# Patient Record
Sex: MALE | Race: WHITE | Employment: PART TIME | URBAN - METROPOLITAN AREA
[De-identification: names, ages, dates, MRNs, and addresses within clinical notes are randomized per-mention and may not be internally consistent; named-entity substitution may affect disease eponyms.]

---
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
## 2022-02-03 ENCOUNTER — TELEPHONE (OUTPATIENT)
Dept: FAMILY MEDICINE CLINIC | Age: 22
End: 2022-02-03

## 2022-03-22 ENCOUNTER — OFFICE VISIT (OUTPATIENT)
Dept: FAMILY MEDICINE CLINIC | Age: 22
End: 2022-03-22
Payer: COMMERCIAL

## 2022-03-22 VITALS
BODY MASS INDEX: 30.87 KG/M2 | HEART RATE: 88 BPM | SYSTOLIC BLOOD PRESSURE: 122 MMHG | WEIGHT: 220.5 LBS | HEIGHT: 71 IN | OXYGEN SATURATION: 98 % | DIASTOLIC BLOOD PRESSURE: 80 MMHG

## 2022-03-22 DIAGNOSIS — Z00.00 ENCOUNTER FOR ANNUAL PHYSICAL EXAM: Primary | ICD-10-CM

## 2022-03-22 DIAGNOSIS — F33.41 RECURRENT MAJOR DEPRESSIVE DISORDER, IN PARTIAL REMISSION (HCC): ICD-10-CM

## 2022-03-22 PROCEDURE — 99385 PREV VISIT NEW AGE 18-39: CPT | Performed by: STUDENT IN AN ORGANIZED HEALTH CARE EDUCATION/TRAINING PROGRAM

## 2022-03-22 PROCEDURE — G8484 FLU IMMUNIZE NO ADMIN: HCPCS | Performed by: STUDENT IN AN ORGANIZED HEALTH CARE EDUCATION/TRAINING PROGRAM

## 2022-03-22 RX ORDER — QUETIAPINE FUMARATE 50 MG/1
TABLET, FILM COATED ORAL
COMMUNITY
Start: 2022-02-17

## 2022-03-22 RX ORDER — VENLAFAXINE HYDROCHLORIDE 75 MG/1
CAPSULE, EXTENDED RELEASE ORAL
COMMUNITY
Start: 2022-02-17

## 2022-03-22 ASSESSMENT — PATIENT HEALTH QUESTIONNAIRE - PHQ9
5. POOR APPETITE OR OVEREATING: 1
SUM OF ALL RESPONSES TO PHQ9 QUESTIONS 1 & 2: 6
1. LITTLE INTEREST OR PLEASURE IN DOING THINGS: 3
SUM OF ALL RESPONSES TO PHQ QUESTIONS 1-9: 19
2. FEELING DOWN, DEPRESSED OR HOPELESS: 3
SUM OF ALL RESPONSES TO PHQ QUESTIONS 1-9: 19
4. FEELING TIRED OR HAVING LITTLE ENERGY: 3
10. IF YOU CHECKED OFF ANY PROBLEMS, HOW DIFFICULT HAVE THESE PROBLEMS MADE IT FOR YOU TO DO YOUR WORK, TAKE CARE OF THINGS AT HOME, OR GET ALONG WITH OTHER PEOPLE: 1
SUM OF ALL RESPONSES TO PHQ QUESTIONS 1-9: 19
9. THOUGHTS THAT YOU WOULD BE BETTER OFF DEAD, OR OF HURTING YOURSELF: 0
3. TROUBLE FALLING OR STAYING ASLEEP: 3
7. TROUBLE CONCENTRATING ON THINGS, SUCH AS READING THE NEWSPAPER OR WATCHING TELEVISION: 3
6. FEELING BAD ABOUT YOURSELF - OR THAT YOU ARE A FAILURE OR HAVE LET YOURSELF OR YOUR FAMILY DOWN: 1
SUM OF ALL RESPONSES TO PHQ QUESTIONS 1-9: 19
8. MOVING OR SPEAKING SO SLOWLY THAT OTHER PEOPLE COULD HAVE NOTICED. OR THE OPPOSITE, BEING SO FIGETY OR RESTLESS THAT YOU HAVE BEEN MOVING AROUND A LOT MORE THAN USUAL: 2

## 2022-03-22 NOTE — PROGRESS NOTES
3/22/2022    Lavonne Crain (:  2000) is a 23 y.o. male, here for evaluation of the following medical concerns:  Chief Complaint   Patient presents with   [facility]       HPI    Depression  Following with psychiatry. Every couple of weeks  Has started seroquel and Effexor  Feels this is helping  Not currently in counseling    Has lost 25lbs in last 8 months  Tries to watch weight with family that is obese    Review of Systems   Allergies  Worsening vision - has optometry appointment    All other systems reviewed and negative    Prior to Visit Medications    Medication Sig Taking?  Authorizing Provider   venlafaxine (EFFEXOR XR) 75 MG extended release capsule  Yes Historical Provider, MD   QUEtiapine (SEROQUEL) 50 MG tablet  Yes Historical Provider, MD        No Known Allergies    Past Medical History:   Diagnosis Date    ADHD (attention deficit hyperactivity disorder)     Anxiety     Depression        Past Surgical History:   Procedure Laterality Date    CYST REMOVAL      OTHER SURGICAL HISTORY      perianal fistula repair    OTHER SURGICAL HISTORY      brachial cleft pit repair       Social History     Socioeconomic History    Marital status: Single     Spouse name: Not on file    Number of children: Not on file    Years of education: Not on file    Highest education level: Not on file   Occupational History    Not on file   Tobacco Use    Smoking status: Never Smoker    Smokeless tobacco: Never Used   Vaping Use    Vaping Use: Never used   Substance and Sexual Activity    Alcohol use: No    Drug use: No    Sexual activity: Not on file   Other Topics Concern    Not on file   Social History Narrative    Not on file     Social Determinants of Health     Financial Resource Strain:     Difficulty of Paying Living Expenses: Not on file   Food Insecurity:     Worried About Running Out of Food in the Last Year: Not on file    Franck of Food in the Last Year: Not on file Transportation Needs:     Lack of Transportation (Medical): Not on file    Lack of Transportation (Non-Medical): Not on file   Physical Activity:     Days of Exercise per Week: Not on file    Minutes of Exercise per Session: Not on file   Stress:     Feeling of Stress : Not on file   Social Connections:     Frequency of Communication with Friends and Family: Not on file    Frequency of Social Gatherings with Friends and Family: Not on file    Attends Oriental orthodox Services: Not on file    Active Member of [address] [place] [address] or Organizations: Not on file    Attends Club or Organization Meetings: Not on file    Marital Status: Not on file   Intimate Partner Violence:     Fear of Current or Ex-Partner: Not on file    Emotionally Abused: Not on file    Physically Abused: Not on file    Sexually Abused: Not on file   Housing Stability:     Unable to Pay for Housing in the Last Year: Not on file    Number of [place] in the Last Year: Not on file    Unstable Housing in the Last Year: Not on file        No family history on file. Vitals:    03/22/22 0805   BP: 122/80   Site: Right Upper Arm   Position: Sitting   Cuff Size: Large Adult   Pulse: 88   SpO2: 98%   Weight: 220 lb 8 oz (100 kg)   Height: 5' 10.5\" (1.791 m)     Estimated body mass index is 31.19 kg/m² as calculated from the following:    Height as of this encounter: 5' 10.5\" (1.791 m). Weight as of this encounter: 220 lb 8 oz (100 kg). Physical Exam  Vitals reviewed. Constitutional:       Appearance: Normal appearance. HENT:      Head: Normocephalic and atraumatic. Cardiovascular:      Rate and Rhythm: Normal rate and regular rhythm. Pulmonary:      Effort: Pulmonary effort is normal.      Breath sounds: Normal breath sounds. Neurological:      General: No focal deficit present. Mental Status: He is alert and oriented to person, place, and time. Psychiatric:         Behavior: Behavior normal.         Thought Content:  Thought content normal.         ASSESSMENT/PLAN:  1. Encounter for annual physical exam  Reports that he has not seen a PCP in several years. No acute concerns today. 2. Recurrent major depressive disorder, in partial remission ([facility] [address]  Reports that he is following with psychiatry and feels that medications are improving his current mood. No active SI/HI. No follow-ups on file. An  electronic signature was used to authenticate this note.     --Reji Blanco, DO on 3/22/2022 at 8:33 AM

## 2022-08-11 ENCOUNTER — APPOINTMENT (OUTPATIENT)
Dept: GENERAL RADIOLOGY | Age: 22
End: 2022-08-11
Payer: COMMERCIAL

## 2022-08-11 ENCOUNTER — HOSPITAL ENCOUNTER (EMERGENCY)
Age: 22
Discharge: HOME | End: 2022-08-12
Attending: EMERGENCY MEDICINE
Payer: COMMERCIAL

## 2022-08-11 VITALS
RESPIRATION RATE: 20 BRPM | BODY MASS INDEX: 32.54 KG/M2 | WEIGHT: 230 LBS | DIASTOLIC BLOOD PRESSURE: 88 MMHG | TEMPERATURE: 101.1 F | HEART RATE: 99 BPM | SYSTOLIC BLOOD PRESSURE: 126 MMHG | OXYGEN SATURATION: 97 %

## 2022-08-11 DIAGNOSIS — R50.9 FEVER, UNSPECIFIED FEVER CAUSE: Primary | ICD-10-CM

## 2022-08-11 DIAGNOSIS — B34.9 VIRAL SYNDROME: ICD-10-CM

## 2022-08-11 LAB
INFLUENZA A: NOT DETECTED
INFLUENZA B: NOT DETECTED
S PYO AG THROAT QL: NEGATIVE
SARS-COV-2 RNA, RT PCR: NOT DETECTED

## 2022-08-11 PROCEDURE — 87081 CULTURE SCREEN ONLY: CPT

## 2022-08-11 PROCEDURE — 71045 X-RAY EXAM CHEST 1 VIEW: CPT

## 2022-08-11 PROCEDURE — 87880 STREP A ASSAY W/OPTIC: CPT

## 2022-08-11 PROCEDURE — 6360000002 HC RX W HCPCS: Performed by: EMERGENCY MEDICINE

## 2022-08-11 PROCEDURE — 87636 SARSCOV2 & INF A&B AMP PRB: CPT

## 2022-08-11 PROCEDURE — 87077 CULTURE AEROBIC IDENTIFY: CPT

## 2022-08-11 PROCEDURE — 96372 THER/PROPH/DIAG INJ SC/IM: CPT

## 2022-08-11 PROCEDURE — 99284 EMERGENCY DEPT VISIT MOD MDM: CPT

## 2022-08-11 PROCEDURE — 6370000000 HC RX 637 (ALT 250 FOR IP): Performed by: EMERGENCY MEDICINE

## 2022-08-11 RX ORDER — IPRATROPIUM BROMIDE AND ALBUTEROL SULFATE 2.5; .5 MG/3ML; MG/3ML
1 SOLUTION RESPIRATORY (INHALATION) ONCE
Status: COMPLETED | OUTPATIENT
Start: 2022-08-11 | End: 2022-08-11

## 2022-08-11 RX ORDER — ONDANSETRON 4 MG/1
4 TABLET, ORALLY DISINTEGRATING ORAL ONCE
Status: COMPLETED | OUTPATIENT
Start: 2022-08-11 | End: 2022-08-11

## 2022-08-11 RX ORDER — DEXAMETHASONE 4 MG/1
10 TABLET ORAL ONCE
Status: COMPLETED | OUTPATIENT
Start: 2022-08-11 | End: 2022-08-11

## 2022-08-11 RX ORDER — KETOROLAC TROMETHAMINE 30 MG/ML
30 INJECTION, SOLUTION INTRAMUSCULAR; INTRAVENOUS ONCE
Status: COMPLETED | OUTPATIENT
Start: 2022-08-11 | End: 2022-08-11

## 2022-08-11 RX ADMIN — DEXAMETHASONE 10 MG: 4 TABLET ORAL at 22:49

## 2022-08-11 RX ADMIN — ONDANSETRON 4 MG: 4 TABLET, ORALLY DISINTEGRATING ORAL at 22:49

## 2022-08-11 RX ADMIN — IPRATROPIUM BROMIDE AND ALBUTEROL SULFATE 1 AMPULE: 2.5; .5 SOLUTION RESPIRATORY (INHALATION) at 22:50

## 2022-08-11 RX ADMIN — KETOROLAC TROMETHAMINE 30 MG: 30 INJECTION, SOLUTION INTRAMUSCULAR at 22:49

## 2022-08-11 ASSESSMENT — PAIN DESCRIPTION - DESCRIPTORS: DESCRIPTORS: ACHING

## 2022-08-11 ASSESSMENT — PAIN - FUNCTIONAL ASSESSMENT: PAIN_FUNCTIONAL_ASSESSMENT: 0-10

## 2022-08-11 ASSESSMENT — PAIN SCALES - GENERAL
PAINLEVEL_OUTOF10: 8
PAINLEVEL_OUTOF10: 7

## 2022-08-11 NOTE — Clinical Note
Wander De Jesus was seen and treated in our emergency department on 8/11/2022. He may return to work on 08/18/2022. If you have any questions or concerns, please don't hesitate to call.       Shahram Gutiérrez MD

## 2022-08-12 LAB
ORGANISM: ABNORMAL
S PYO THROAT QL CULT: ABNORMAL
S PYO THROAT QL CULT: ABNORMAL

## 2022-08-12 RX ORDER — ONDANSETRON 4 MG/1
4 TABLET, FILM COATED ORAL 3 TIMES DAILY PRN
Qty: 15 TABLET | Refills: 0 | Status: SHIPPED | OUTPATIENT
Start: 2022-08-12

## 2022-08-12 RX ORDER — DEXAMETHASONE 2 MG/1
10 TABLET ORAL ONCE
Qty: 5 TABLET | Refills: 0 | Status: SHIPPED | OUTPATIENT
Start: 2022-08-12 | End: 2022-08-12

## 2022-08-12 NOTE — ED PROVIDER NOTES
CHIEF COMPLAINT  Fever      HISTORY OF PRESENT ILLNESS  Wander De Jesus is a 23 y.o. male presenting for evaluation of fever, muscle aches, headache. Patient states that he has had the symptoms for the past several days. Was recently at a gathering of about 15 people. No immediate sick contacts. He states that he has had some mild shortness of breath no chest pain no leg swelling. No diagnosis of asthma. He did take Tylenol but this was around noon today. Has been nauseous but able to tolerate p.o. No other complaints, modifying factors or associated symptoms. I have reviewed the following from the nursing documentation. Past Medical History:   Diagnosis Date    ADHD (attention deficit hyperactivity disorder)     Anxiety     Depression      Past Surgical History:   Procedure Laterality Date    CYST REMOVAL      OTHER SURGICAL HISTORY      perianal fistula repair    OTHER SURGICAL HISTORY      brachial cleft pit repair     No family history on file. Social History     Socioeconomic History    Marital status: Single     Spouse name: Not on file    Number of children: Not on file    Years of education: Not on file    Highest education level: Not on file   Occupational History    Not on file   Tobacco Use    Smoking status: Never    Smokeless tobacco: Never   Vaping Use    Vaping Use: Never used   Substance and Sexual Activity    Alcohol use: No    Drug use: No    Sexual activity: Not on file   Other Topics Concern    Not on file   Social History Narrative    Not on file     Social Determinants of Health     Financial Resource Strain: Not on file   Food Insecurity: Not on file   Transportation Needs: Not on file   Physical Activity: Not on file   Stress: Not on file   Social Connections: Not on file   Intimate Partner Violence: Not on file   Housing Stability: Not on file     No current facility-administered medications for this encounter.      Current Outpatient Medications   Medication Sig Dispense Refill dexamethasone (DECADRON) 2 MG tablet Take 5 tablets by mouth once for 1 dose Take this dose 3 days from now on August 15 5 tablet 0    ondansetron (ZOFRAN) 4 MG tablet Take 1 tablet by mouth 3 times daily as needed for Nausea or Vomiting 15 tablet 0    venlafaxine (EFFEXOR XR) 75 MG extended release capsule  (Patient not taking: Reported on 8/11/2022)      QUEtiapine (SEROQUEL) 50 MG tablet  (Patient not taking: Reported on 8/11/2022)       No Known Allergies    REVIEW OF SYSTEMS  Positive and pertinent negatives as per HPI. All other systems were reviewed and are negative. PHYSICAL EXAM  /88   Pulse 99   Temp (!) 101.1 °F (38.4 °C) (Oral)   Resp 20   Wt 230 lb (104.3 kg)   SpO2 97%   BMI 32.54 kg/m²   GENERAL APPEARANCE: Awake and alert. Cooperative. HEAD: Normocephalic. Atraumatic. EYES: PERRL. EOM's grossly intact. ENT: Mucous membranes are moist.  Posterior pharyngeal erythema no significant swelling or tonsillar exudate  NECK: Supple, trachea midline. No significant lymphadenopathy  HEART: Cardiac. No harsh murmurs. Intact radial pulses 2+ bilaterally. LUNGS: Respirations unlabored without accessory muscle use. CTAB. Good air exchange. No wheezes, rales, or rhonchi. Speaking comfortably in full sentences. ABDOMEN: Soft. Non-distended. Non-tender. No guarding or rebound. EXTREMITIES: No peripheral edema. No acute deformities. SKIN: Warm and dry. No acute rashes. NEUROLOGICAL: Alert and oriented X 3. No focal deficits    LABS  I have reviewed all labs for this visit.    Results for orders placed or performed during the hospital encounter of 08/11/22   COVID-19 & Influenza Combo    Specimen: Nasopharyngeal Swab   Result Value Ref Range    SARS-CoV-2 RNA, RT PCR NOT DETECTED NOT DETECTED    INFLUENZA A NOT DETECTED NOT DETECTED    INFLUENZA B NOT DETECTED NOT DETECTED   Strep screen group a throat    Specimen: Throat   Result Value Ref Range    Rapid Strep A Screen Negative Negative RADIOLOGY  X-RAYS:  I have reviewed radiologic plain film image(s). ALL OTHER NON-PLAIN FILM IMAGES SUCH AS CT, ULTRASOUND AND MRI HAVE BEEN READ BY THE RADIOLOGIST. XR CHEST PORTABLE   Final Result   No acute process. No results found. During the patient's ED course, the patient was given:  Medications   ondansetron (ZOFRAN-ODT) disintegrating tablet 4 mg (4 mg Oral Given 8/11/22 2249)   ketorolac (TORADOL) injection 30 mg (30 mg IntraMUSCular Given 8/11/22 2249)   dexamethasone (DECADRON) tablet 10 mg (10 mg Oral Given 8/11/22 2249)   ipratropium-albuterol (DUONEB) nebulizer solution 1 ampule (1 ampule Inhalation Given 8/11/22 2250)        ED COURSE/MDM  Patient seen and evaluated. Old records reviewed. Labs and imaging reviewed and results discussed with patient. 72-year-old male presenting with viral syndrome. He is febrile to 102.7 otherwise hemodynamically stable. No respiratory distress no focal breath sounds. Patient will be given Decadron for sore throat, Toradol for fever, breathing treatment. He will trial p.o. fluids. We will obtain chest x-ray to screen for pneumonia. This was unremarkable for focal process. We will obtain strep, COVID and flu testing. Rapid strep, COVID and flu testing is negative. Chest x-ray without focal process. Fever did improve after Toradol. Advised on the importance of scheduled Tylenol, ibuprofen, drinking plenty of fluids. He will be prescribed Zofran, second dose of Decadron to be taken 3 days from now. Advised to return for worsening shortness of breath or any other concerns. He has no abdominal pain to suggest abdominal source of fever. The patient will be discharged from the emergency department. The patient was counseled on their diagnosis and any medications prescribed. They were advised on the need for PCP followup.  They were counseled on the need to return to the emergency department if any of their symptoms were to worsen, change or have any other concerns. Discharged in stable condition. Is this patient to be included in the SEP-1 Core Measure due to severe sepsis or septic shock? No   Exclusion criteria - the patient is NOT to be included for SEP-1 Core Measure due to:  Viral etiology found or highly suspected (including COVID-19) without concomitant bacterial infection    Patient was given scripts for the following medications. I counseled patient how to take these medications. Discharge Medication List as of 8/12/2022 12:04 AM        START taking these medications    Details   dexamethasone (DECADRON) 2 MG tablet Take 5 tablets by mouth once for 1 dose Take this dose 3 days from now on August 15, Disp-5 tablet, R-0Normal      ondansetron (ZOFRAN) 4 MG tablet Take 1 tablet by mouth 3 times daily as needed for Nausea or Vomiting, Disp-15 tablet, R-0Normal             CLINICAL IMPRESSION  1. Fever, unspecified fever cause    2. Viral syndrome        Blood pressure 126/88, pulse 99, temperature (!) 101.1 °F (38.4 °C), temperature source Oral, resp. rate 20, weight 230 lb (104.3 kg), SpO2 97 %. DISPOSITION  Robbie Holder was discharged to home in stable condition. This chart was generated in part by using Dragon Dictation system and may contain errors related to that system including errors in grammar, punctuation, and spelling, as well as words and phrases that may be inappropriate. If there are any questions or concerns please feel free to contact the dictating provider for clarification.        Evonne King MD  08/12/22 0049

## 2022-08-12 NOTE — DISCHARGE INSTRUCTIONS
You likely have a viral infection, your COVID test and flu test was negative. You do not have pneumonia. Is very importantly take Tylenol and ibuprofen on a scheduled basis. Tylenol 1000 mg every 8 hours and ibuprofen 600 every 8 hours. Please drink plenty of fluids and take the nausea medicine as needed. Please return for any worsening symptoms.

## 2022-08-24 ENCOUNTER — TELEMEDICINE (OUTPATIENT)
Dept: PRIMARY CARE CLINIC | Age: 22
End: 2022-08-24
Payer: COMMERCIAL

## 2022-08-24 DIAGNOSIS — L60.0 INGROWN NAIL OF GREAT TOE OF LEFT FOOT: Primary | ICD-10-CM

## 2022-08-24 DIAGNOSIS — N52.9 ERECTILE DYSFUNCTION, UNSPECIFIED ERECTILE DYSFUNCTION TYPE: ICD-10-CM

## 2022-08-24 PROCEDURE — G8427 DOCREV CUR MEDS BY ELIG CLIN: HCPCS | Performed by: NURSE PRACTITIONER

## 2022-08-24 PROCEDURE — 99213 OFFICE O/P EST LOW 20 MIN: CPT | Performed by: NURSE PRACTITIONER

## 2022-08-24 RX ORDER — CEPHALEXIN 500 MG/1
500 CAPSULE ORAL 2 TIMES DAILY
Qty: 14 CAPSULE | Refills: 0 | Status: SHIPPED | OUTPATIENT
Start: 2022-08-24 | End: 2022-08-31

## 2022-08-24 NOTE — PROGRESS NOTES
Jonah Kim (:  2000) is a Established patient, here for evaluation of the following: Ingrown toenail to left great toe  Infection noted with bloody drainage noted  History of ingrown toenail  Erectile dysfunction having trouble getting an erection    Assessment & Plan   Below is the assessment and plan developed based on review of pertinent history, physical exam, labs, studies, and medications. 1. Ingrown nail of great toe of left foot  Problem  -     cephALEXin (KEFLEX) 500 MG capsule; Take 1 capsule by mouth 2 times daily for 7 days, Disp-14 capsule, R-0Normal  -     mupirocin (BACTROBAN) 2 % ointment; Apply topically 3 times daily. , Disp-30 g, R-1, Normal  See patient instructions    He was instructed he must call his podiatrist to get the nail removed he verbalizes understanding of this      2. Erectile dysfunction, unspecified erectile dysfunction type  Problem  -     [facility] - Traci Luis MD, Urology, [facility]    Follow-up with PCP if symptoms do not improve or worsen  Please call urologist for first available appointment  Please call podiatrist for left great toenail removal        Subjective   This is a 68-year-old male patient of  consenting to half virtual and half telephone visit due to technical difficulties. Patient states that he has an ingrown toenail to the left great toe it is very painful to touch when he walks and it is draining. Scab is noted with old bloody drainage. He states he has a podiatrist and in January he cut out the ingrown nail but it grew back  I was able to visualize his left great toe before the technical difficulties occurred  He also is complaining of erectile dysfunction he states he has trouble getting an erection. He denies no smoking or illegal drugs. Review of Systems   Skin:  Positive for wound (Left great toe). All other systems reviewed and are negative.       Objective   Patient-Reported Vitals  No data recorded     Physical Exam  [INSTRUCTIONS:  \"[x]\" Indicates a positive item  \"[]\" Indicates a negative item  -- DELETE ALL ITEMS NOT EXAMINED]    Constitutional: [x] Appears well-developed and well-nourished [x] No apparent distress      [] Abnormal -     Mental status: [x] Alert and awake  [x] Oriented to person/place/time [x] Able to follow commands    [] Abnormal -     Eyes:   EOM    [x]  Normal    [] Abnormal -   Sclera  [x]  Normal    [] Abnormal -          Discharge [x]  None visible   [] Abnormal -     HENT: [x] Normocephalic, atraumatic  [] Abnormal -   [] Mouth/Throat: Mucous membranes are moist    External Ears [] Normal  [] Abnormal -    Neck: [x] No visualized mass [] Abnormal -     Pulmonary/Chest: [x] Respiratory effort normal   [x] No visualized signs of difficulty breathing or respiratory distress        [] Abnormal -      Musculoskeletal:   [] Normal gait with no signs of ataxia         [x] Normal range of motion of neck        [] Abnormal -     Neurological:        [x] No Facial Asymmetry (Cranial nerve 7 motor function) (limited exam due to video visit)          [] No gaze palsy        [] Abnormal -          Skin:        [] No significant exanthematous lesions or discoloration noted on facial skin         [x] Abnormal -left great toe old bloody drainage noted           Psychiatric:       [x] Normal Affect [] Abnormal -       On this date 8/24/2022 I have spent 20 minutes reviewing previous notes, test results and 1/2 face to face (virtual)  1/2 telephone due to technical difficulties with the patient discussing the diagnosis and importance of compliance with the treatment plan as well as documenting on the day of the visit. Nneka Brooks, was evaluated through a synchronous (real-time) audio-video encounter. The patient (or guardian if applicable) is aware that this is a billable service, which includes applicable co-pays. This Virtual Visit was conducted with patient's (and/or legal guardian's) consent.  The visit was conducted pursuant to the emergency declaration under the [facility], [facility] authority and the [employer] and [employer] General Act. Patient identification was verified, and a caregiver was present when appropriate. The patient was located at Home: [address] [address].    [facility] Chiara [id number]

## 2022-10-21 ENCOUNTER — NURSE TRIAGE (OUTPATIENT)
Dept: OTHER | Facility: CLINIC | Age: 22
End: 2022-10-21

## 2022-10-21 NOTE — TELEPHONE ENCOUNTER
Location of patient: [place]    Received call from [employer] at [facility] with [employer]. Subjective: Caller states \"abd pain and vomiting \"     Current Symptoms: abd pain and vomiting at least one time was black dark color, no medical issues never had this before     Dad is the caller and pt not with caller so limited triage    Onset: 1 day ago;     Associated Symptoms: NA    Pain Severity: unsure    Temperature: unsure       What has been tried: nothing    LMP: NA Pregnant: NA    Recommended disposition: Go to ED Now    Care advice provided, patient verbalizes understanding; denies any other questions or concerns; instructed to call back for any new or worsening symptoms. Attention Provider: Thank you for allowing me to participate in the care of your patient. The patient was connected to triage in response to information provided to the ECC/PSC. Please do not respond through this encounter as the response is not directed to a shared pool.            Reason for Disposition   Vomiting red blood or black (coffee ground) material    Protocols used: Abdominal Pain - Male-ADULT-[place]

## 2022-10-24 ENCOUNTER — NURSE TRIAGE (OUTPATIENT)
Dept: OTHER | Facility: CLINIC | Age: 22
End: 2022-10-24

## 2022-10-24 ENCOUNTER — OFFICE VISIT (OUTPATIENT)
Dept: FAMILY MEDICINE CLINIC | Age: 22
End: 2022-10-24
Payer: COMMERCIAL

## 2022-10-24 VITALS
HEIGHT: 71 IN | WEIGHT: 240.8 LBS | DIASTOLIC BLOOD PRESSURE: 82 MMHG | TEMPERATURE: 98.3 F | OXYGEN SATURATION: 98 % | HEART RATE: 113 BPM | BODY MASS INDEX: 33.71 KG/M2 | SYSTOLIC BLOOD PRESSURE: 114 MMHG

## 2022-10-24 DIAGNOSIS — K58.9 IRRITABLE BOWEL SYNDROME, UNSPECIFIED TYPE: ICD-10-CM

## 2022-10-24 DIAGNOSIS — K21.9 GASTROESOPHAGEAL REFLUX DISEASE WITHOUT ESOPHAGITIS: ICD-10-CM

## 2022-10-24 DIAGNOSIS — K52.9 ACUTE GASTROENTERITIS: ICD-10-CM

## 2022-10-24 DIAGNOSIS — R63.1 POLYDIPSIA: ICD-10-CM

## 2022-10-24 DIAGNOSIS — R11.2 NAUSEA AND VOMITING, UNSPECIFIED VOMITING TYPE: Primary | ICD-10-CM

## 2022-10-24 LAB — HBA1C MFR BLD: 4.6 %

## 2022-10-24 PROCEDURE — G8417 CALC BMI ABV UP PARAM F/U: HCPCS | Performed by: NURSE PRACTITIONER

## 2022-10-24 PROCEDURE — G8484 FLU IMMUNIZE NO ADMIN: HCPCS | Performed by: NURSE PRACTITIONER

## 2022-10-24 PROCEDURE — 83036 HEMOGLOBIN GLYCOSYLATED A1C: CPT | Performed by: NURSE PRACTITIONER

## 2022-10-24 PROCEDURE — 1036F TOBACCO NON-USER: CPT | Performed by: NURSE PRACTITIONER

## 2022-10-24 PROCEDURE — G8427 DOCREV CUR MEDS BY ELIG CLIN: HCPCS | Performed by: NURSE PRACTITIONER

## 2022-10-24 PROCEDURE — 99213 OFFICE O/P EST LOW 20 MIN: CPT | Performed by: NURSE PRACTITIONER

## 2022-10-24 RX ORDER — ONDANSETRON 4 MG/1
4 TABLET, ORALLY DISINTEGRATING ORAL 3 TIMES DAILY PRN
Qty: 21 TABLET | Refills: 0 | Status: SHIPPED | OUTPATIENT
Start: 2022-10-24

## 2022-10-24 RX ORDER — OMEPRAZOLE 20 MG/1
20 CAPSULE, DELAYED RELEASE ORAL
Qty: 90 CAPSULE | Refills: 1 | Status: SHIPPED | OUTPATIENT
Start: 2022-10-24

## 2022-10-24 SDOH — ECONOMIC STABILITY: TRANSPORTATION INSECURITY
IN THE PAST 12 MONTHS, HAS LACK OF TRANSPORTATION KEPT YOU FROM MEETINGS, WORK, OR FROM GETTING THINGS NEEDED FOR DAILY LIVING?: NO

## 2022-10-24 SDOH — ECONOMIC STABILITY: FOOD INSECURITY: WITHIN THE PAST 12 MONTHS, THE FOOD YOU BOUGHT JUST DIDN'T LAST AND YOU DIDN'T HAVE MONEY TO GET MORE.: NEVER TRUE

## 2022-10-24 SDOH — ECONOMIC STABILITY: FOOD INSECURITY: WITHIN THE PAST 12 MONTHS, YOU WORRIED THAT YOUR FOOD WOULD RUN OUT BEFORE YOU GOT MONEY TO BUY MORE.: NEVER TRUE

## 2022-10-24 SDOH — ECONOMIC STABILITY: HOUSING INSECURITY
IN THE LAST 12 MONTHS, WAS THERE A TIME WHEN YOU DID NOT HAVE A STEADY PLACE TO SLEEP OR SLEPT IN A SHELTER (INCLUDING NOW)?: NO

## 2022-10-24 SDOH — ECONOMIC STABILITY: TRANSPORTATION INSECURITY
IN THE PAST 12 MONTHS, HAS THE LACK OF TRANSPORTATION KEPT YOU FROM MEDICAL APPOINTMENTS OR FROM GETTING MEDICATIONS?: NO

## 2022-10-24 SDOH — ECONOMIC STABILITY: INCOME INSECURITY: IN THE LAST 12 MONTHS, WAS THERE A TIME WHEN YOU WERE NOT ABLE TO PAY THE MORTGAGE OR RENT ON TIME?: NO

## 2022-10-24 SDOH — ECONOMIC STABILITY: HOUSING INSECURITY: IN THE LAST 12 MONTHS, HOW MANY PLACES HAVE YOU LIVED?: 2

## 2022-10-24 ASSESSMENT — SOCIAL DETERMINANTS OF HEALTH (SDOH): HOW HARD IS IT FOR YOU TO PAY FOR THE VERY BASICS LIKE FOOD, HOUSING, MEDICAL CARE, AND HEATING?: NOT HARD AT ALL

## 2022-10-24 ASSESSMENT — ENCOUNTER SYMPTOMS
SINUS PRESSURE: 0
VOMITING: 1
RHINORRHEA: 0
DIARRHEA: 0
SHORTNESS OF BREATH: 0
NAUSEA: 1
SINUS PAIN: 0
COUGH: 0

## 2022-10-24 NOTE — TELEPHONE ENCOUNTER
Location of patient: [place]    Received call from Michael Robin at [facility] with Red Flag Complaint. Subjective: Caller states \"I am having abdominal pain and vomiting\"     Current Symptoms: vomiting, abdominal pain, dizziness    Onset: 4 day ago; worsening    Pain Severity: 5/10; cramping; intermittent    Temperature: denies     What has been tried: NA    Recommended disposition: See in Office Today    Care advice provided, patient verbalizes understanding; denies any other questions or concerns; instructed to call back for any new or worsening symptoms. Patient/Caller agrees with recommended disposition; writer provided warm transfer to [employer] at [facility] for appointment scheduling    Attention Provider: Thank you for allowing me to participate in the care of your patient. The patient was connected to triage in response to information provided to the ECC/PSC. Please do not respond through this encounter as the response is not directed to a shared pool.     Reason for Disposition   MODERATE pain (e.g., interferes with normal activities) that comes and goes (cramps) lasts > 24 hours  (Exception: pain with Vomiting or Diarrhea - see that Protocol)    Protocols used: Abdominal Pain - Male-ADULT-[place]

## 2022-10-24 NOTE — PROGRESS NOTES
Joaquim German (:  2000) is a 23 y.o. male,Established patient, here for evaluation of the following chief complaint(s):  Emesis (Since 10/21/2022 )         ASSESSMENT/PLAN:  1. Nausea and vomiting, unspecified vomiting type  -     ondansetron (ZOFRAN-ODT) 4 MG disintegrating tablet; Take 1 tablet by mouth 3 times daily as needed for Nausea or Vomiting, Disp-21 tablet, R-0Normal  -     omeprazole (PRILOSEC) 20 MG delayed release capsule; Take 1 capsule by mouth every morning (before breakfast), Disp-90 capsule, R-1Normal    2. Polydipsia  -     POCT glycosylated hemoglobin (Hb A1C)    -Results 4.8    3. Irritable bowel syndrome, unspecified type  -     AFL - Tammi Solares MD, Gastroenterology [facility]    4. Acute gastroenteritis     -drink plenty of fluids       -Monitor symptoms       -If symptoms do not resolve notify office       -If develop severe abdominal pain, symptoms worsen, notice coffee ground emesis or stool or blood in emesis or stool go to ER     5. Gastroesophageal reflux disease without esophagitis  -     omeprazole (PRILOSEC) 20 MG delayed release capsule; Take 1 capsule by mouth every morning (before breakfast), Disp-90 capsule, R-1Normal  -     AFL - Tammi Solares MD, Gastroenterology [facility]    As headaches related to different causes and with no pattern, recommend headache journal to review with PCP at next appointment       Return if symptoms worsen or fail to improve. Subjective   SUBJECTIVE/OBJECTIVE:  Patient reports that he has been vomited on 10/21, at that time he woke up with reflux symptoms and vomited at that time. Reports it was dark colored at the time but he reports he drank root beer prior to this incident. Denies any coffee ground emesis. No further dark colored emesis and no further emesis until this AM. Today he again vomited but it was \"chunky\"   Reports he does get heartburn but does not take anything at this time for it.    States \" I have bad stomach issues\"   Patient states he does smoke some marijuana and reports social alcohol of two beers when with friends   Bowel movements 2-3x daily, reports he had a bowel movement this AM which he reports as normal for him, states she has bouts of diarrhea, family history of IBS including parents and sister     Patient reports light sensitivity and headaches to frontal area. Reports daily headache but seem triggered by physical activity, not eating or stress. Headache length varies and resolve once he corrects the cause   Family history of migraines. Reports polydipsia and that at times he feels bad but this improves when eating sugar. Father has DM. Would like to be checked for diabetes       Emesis   Associated symptoms include headaches. Pertinent negatives include no chest pain, chills, coughing, diarrhea, dizziness or fever. Review of Systems   Constitutional:  Negative for chills and fever. HENT:  Negative for congestion, rhinorrhea, sinus pressure and sinus pain. Respiratory:  Negative for cough and shortness of breath. Cardiovascular:  Negative for chest pain. Gastrointestinal:  Positive for nausea and vomiting. Negative for diarrhea. Genitourinary:  Negative for difficulty urinating. Neurological:  Positive for headaches. Negative for dizziness and numbness. Objective   Physical Exam  Constitutional:       General: He is not in acute distress. Appearance: He is not ill-appearing. HENT:      Head: Normocephalic. Nose: Nose normal.      Mouth/Throat:      Mouth: Mucous membranes are moist.   Cardiovascular:      Rate and Rhythm: Normal rate and regular rhythm. Heart sounds: Normal heart sounds. No murmur heard. No friction rub. Pulmonary:      Effort: Pulmonary effort is normal.      Breath sounds: Normal breath sounds. Abdominal:      General: Bowel sounds are decreased. Palpations: Abdomen is soft. There is no hepatomegaly or splenomegaly. Tenderness: There is no abdominal tenderness. There is no right CVA tenderness, left CVA tenderness, guarding or rebound. Negative signs include Fermin's sign and psoas sign. Comments: Stretch marks on abdomen      Musculoskeletal:         General: Normal range of motion. Cervical back: Normal range of motion. Skin:     General: Skin is warm and dry. Capillary Refill: Capillary refill takes less than 2 seconds. Neurological:      General: No focal deficit present. Mental Status: He is alert and oriented to person, place, and time. This note was generated completely or in part utilizing Dragon dictation speech recognition software. Occasionally, words are mistranscribed and despite editing, the text may contain inaccuracies due to incorrect word recognition. If further clarification is needed please contact the office at [phone]. An electronic signature was used to authenticate this note.     --RAFAEL Navarrete - CNP